# Patient Record
(demographics unavailable — no encounter records)

---

## 2024-12-03 NOTE — HISTORY OF PRESENT ILLNESS
[FreeTextEntry1] : This is a 6 year old boy with speech delay, dyslexia and stutter presenting for follow up.   Interval Hx: Since last visit Alonzo has been doing very well. He is now in a [employer] class and has been improving academically. No concerns from mom or his teachers. Teacher state that his confidence is only thing holding him back. He had a sleep study which was normal. He has not been complaining that his head hurts(unless he is angry).   Initial HPI: Mom states that this year he was having a lot of disruption in school since the Spring. Mom later noticed that he would say his brain hurts. He would hold his head and shake it back and forth saying his brain hurts for up to 20 minutes. Seems to happens whenever he is angry. Since he has been in  over the summer he has been better. He does not have motion sickness or nausea. Mom states that he is a very active child, he does Karate and other activities. Mom also endorses he is senstive and will say things like he is stupid as he knows he is behind from others his age.  Sleep: snoring, sleeps with tongue out, waiting to get a sleep study done  Past medical history: Eczema Allergies: NKDA, dairy  Current Medications: None  Birth history: Born full term, elective C section [facility], BW 7lbs 12 oz. No complications. Developmental history: Walked 15 months. Single words first developed at a few years but started speaking better towards . Family History: Mom has migraines and dyslexia. MGF had seizures There is no family history of autism. Social history: Lives with mom and 8 yo older sister. 1st grade [employer] in 6:1:1, Getting ST, OT. He has a behavior plan.  Neuroinvestigations: None Sleep study  8/16/24 [facility]: normal except for mild snoring, no ADRIEL  ROS: As per HPI. Denies constitutional, GI symptoms. No rashes. + headaches, no head injury. No corrective eyeglasses. No cough, SOB. No joint inflammation or arthralgia. No motion sickness  Physical Exam: HC 49.5 General: Well nourished, no dysmorphic features. In no acute distress. Normocephalic. No neurocutaneous stigmata. Mental status: Alert, attentive to examiner. Can follow simple commands. Answers questions appropriately. Normal language for age. Cranial Nerves: EOM intact in all directions. No nystagmus, facial sensation intact, facial activation full and symmetric, tongue midline, hearing intact to conversation, Motor: Normal bulk, tone is decreased. Power is 5/5 and symmetric in all extremities. Sensation: Intact to light tough all 4 extremities Reflexes: DTRs are 2+ and symmetric in biceps, triceps, patellar and ankles. Plantar response is downgoing Gait/Coordination: Finger to nose smooth without dysmetria, no abnormal movements. Fine motor movements normal and symmetric. Gait is narrow based.  Assessment: Alonzo's visit today had a duration of 30 minutes (>50% of which was spent in direct counseling and coordination of their care). I personally reviewed all pertinent aspects of his medical history, outside medical records, test results, recent developments, and then delineated next steps for their neurological care. This is a 6 year old boy with speech delay, dyslexia and stutter presenting for follow up. No longer having head pain, sleep study without signs of sleep apnea. He is improving in smaller class setting, would not make any changes at this time.   Plan: Continue school based services   Follow up in 5 months virtual visit   Linda Jordan DO  of Neurology   Pediatric Neurology and Pediatric Epilepsy, [facility]